# Patient Record
Sex: FEMALE | Race: WHITE | HISPANIC OR LATINO
[De-identification: names, ages, dates, MRNs, and addresses within clinical notes are randomized per-mention and may not be internally consistent; named-entity substitution may affect disease eponyms.]

---

## 2024-10-15 ENCOUNTER — DASHBOARD ENCOUNTERS (OUTPATIENT)
Age: 19
End: 2024-10-15

## 2024-10-15 ENCOUNTER — OFFICE VISIT (OUTPATIENT)
Dept: URBAN - METROPOLITAN AREA CLINIC 63 | Facility: CLINIC | Age: 19
End: 2024-10-15

## 2024-10-15 VITALS
HEIGHT: 59 IN | WEIGHT: 110 LBS | OXYGEN SATURATION: 99 % | BODY MASS INDEX: 22.18 KG/M2 | TEMPERATURE: 97.8 F | DIASTOLIC BLOOD PRESSURE: 60 MMHG | SYSTOLIC BLOOD PRESSURE: 90 MMHG | HEART RATE: 78 BPM

## 2024-10-15 PROCEDURE — 993 APS NON BILLABLE

## 2024-10-15 NOTE — HPI-TODAY'S VISIT:
This is a very pleasant 19-year-old female who presents to the office with a chief complaint of blood in stool.  Past medical history is significant for H. pylori infection, ileitis.  Past surgical history is significant for colonoscopy and endoscopy. Recent hospitalizations include neck pain, 7/9/2024.  Family history is noncontributory. Last colonoscopy, 5/9/2023, Dr. Per Francisco, [facility] Last endoscopy 5/9/2023, Dr. Per Francisco, [facility] . Patient presents today explaining that she had a colonoscopy and endoscopy back in May 2023, she was able to bring up the OP note listing Dr. Per Francisco in the visit.  I explained to the patient that she is already established with [facility] and  follow-up with [facility] outpatient for all further GI care.
